# Patient Record
(demographics unavailable — no encounter records)

---

## 2025-04-15 NOTE — HISTORY OF PRESENT ILLNESS
[FreeTextEntry1] : 74 y/o  female presenting for annual GYN exam.  Last seen 2024, neg pap/hpv. Mammo & DEXA 2024. Pt complains of pain in her left arm that extends to axilla.  she is also concerned about pelvic organ prolapse but does ot have bothersome symptoms.  Otherwise doing well.

## 2025-04-15 NOTE — PLAN
[FreeTextEntry1] : 74 y/o  female presenting for annual GYN exam.  -HPV/Pap UTD -Referral given for mammo/sono -Advised colonoscopy - DEXA UTD  -RTO 1-2 years for annual

## 2025-04-15 NOTE — END OF VISIT
[FreeTextEntry3] : I, Danya Morris, acted as a scribe on behalf of Dr. Danya Jurado M.D. on 04/15/2025 .   All medical entries made by the scribe were at my Dr. Danya Jurado M.D direction and personally dictated by me on  04/15/2025 . I have reviewed the chart and agree that the record accurately reflects my personal performance of the history, physical exam, assessment and plan. I have also personally directed, reviewed, and agreed with the chart.